# Patient Record
Sex: MALE | Race: WHITE
[De-identification: names, ages, dates, MRNs, and addresses within clinical notes are randomized per-mention and may not be internally consistent; named-entity substitution may affect disease eponyms.]

---

## 2020-07-31 ENCOUNTER — HOSPITAL ENCOUNTER (OUTPATIENT)
Dept: HOSPITAL 41 - JD.SDS | Age: 45
Discharge: HOME | End: 2020-07-31
Attending: SURGERY
Payer: COMMERCIAL

## 2020-07-31 DIAGNOSIS — E78.00: ICD-10-CM

## 2020-07-31 DIAGNOSIS — E78.5: ICD-10-CM

## 2020-07-31 DIAGNOSIS — Z87.891: ICD-10-CM

## 2020-07-31 DIAGNOSIS — E66.9: ICD-10-CM

## 2020-07-31 DIAGNOSIS — Z99.89: ICD-10-CM

## 2020-07-31 DIAGNOSIS — K21.9: ICD-10-CM

## 2020-07-31 DIAGNOSIS — K80.64: Primary | ICD-10-CM

## 2020-07-31 DIAGNOSIS — Z11.59: ICD-10-CM

## 2020-07-31 DIAGNOSIS — G47.33: ICD-10-CM

## 2020-07-31 DIAGNOSIS — Z79.899: ICD-10-CM

## 2020-07-31 PROCEDURE — 87635 SARS-COV-2 COVID-19 AMP PRB: CPT

## 2020-07-31 PROCEDURE — U0002 COVID-19 LAB TEST NON-CDC: HCPCS

## 2020-07-31 PROCEDURE — 47562 LAPAROSCOPIC CHOLECYSTECTOMY: CPT

## 2020-07-31 NOTE — PCM.POSTAN
POST ANESTHESIA ASSESSMENT





- MENTAL STATUS


Mental Status: Somnolent





- VITAL SIGNS


Vital Signs: 


                                Last Vital Signs











Temp  97.6 F   07/31/20 14:25


 


Pulse  65   07/31/20 10:55


 


Resp  16   07/31/20 14:25


 


BP  119/84   07/31/20 14:25


 


Pulse Ox  100   07/31/20 14:25














- RESPIRATORY


Respiratory Status: Respiratory Rate WNL, Airway Patent, O2 Saturation Stable, 

Supplemental Oxygen





- CARDIOVASCULAR


CV Status: Pulse Rate WNL, Blood Pressure Stable





- GASTROINTESTINAL


GI Status: No Symptoms





- PAIN


Pain Score: 0





- POST OP HYDRATION


Hydration Status: Adequate & Stable

## 2020-07-31 NOTE — [id number]
Post Anesthesia Note





- EVALUATION WITHIN 48HRS OF ANESTHETIC


Vital Signs in Normal Range: Yes


Patient Participated in Evaluation: Yes


Respiratory Function Stable: Yes


Airway Patent: Yes


Cardiovascular Function Stable: Yes


Hydration Status Stable: Yes


Pain Control Satisfactory: Yes


Nausea and Vomiting Control Satisfactory: Yes


Mental Status Recovered: Yes


Vital Signs: 


                                Last Vital Signs











Temp  97.6 F   07/31/20 14:25


 


Pulse  80   07/31/20 15:15


 


Resp  13   07/31/20 15:30


 


BP  119/83   07/31/20 15:30


 


Pulse Ox  100   07/31/20 15:36














- COMMENTS/OBSERVATIONS


Free Text/Narrative:: 





No anesthesia complications noted. Patient is transferred to room 18 for 

extended floor recovery

## 2020-07-31 NOTE — PCM.PREANE
Preanesthetic Assessment





- Procedure


Proposed Procedure: 





lap choley





- Anesthesia/Transfusion/Family Hx


Anesthesia History: Prior Anesthesia Without Reaction


Family History of Anesthesia Reaction: No


Transfusion History: No Prior Transfusion(s)





- Review of Systems


General: No Symptoms


Pulmonary: No Symptoms


Cardiovascular: No Symptoms


Gastrointestinal: Abdominal Pain (GB)


Neurological: No Symptoms





- Physical Assessment


NPO Status Date: 07/30/20


NPO Status Time: 20:00


Vital Signs: 





124/79


65


98%


16


98.2


Height: 6 ft


Weight: 120 kg


ASA Class: 2


Mental Status: Alert & Oriented x3


Airway Class: Mallampati = 1


Dentition: Reports: Normal Dentition


Thyro-Mental Finger Breadths: 3


Mouth Opening Finger Breadths: 3


ROM/Head Extension: Full


Lungs: Clear to Auscultation, Normal Respiratory Effort


Cardiovascular: Regular Rate, Regular Rhythm





- Allergies


Allergies/Adverse Reactions: 


                                    Allergies











Allergy/AdvReac Type Severity Reaction Status Date / Time


 


No Known Allergies Allergy   Verified 07/30/20 12:38














- Blood


Blood Available: No





- Acknowledgements


Anesthesia Type Planned: General Anesthesia


Pt an Appropriate Candidate for the Planned Anesthesia: Yes


Alternatives and Risks of Anesthesia Discussed w Pt/Guardian: Yes


Pt/Guardian Understands and Agrees with Anesthesia Plan: Yes





PreAnesthesia Questionnaire


HEENT History: Reports: None


Cardiovascular History: Reports: High Cholesterol


Respiratory History: Reports: Sleep Apnea (uses cpap)


Gastrointestinal History: Reports: GERD


Genitourinary History: Reports: None


OB/GYN History: Reports: None


Musculoskeletal History: Reports: None


Neurological History: Reports: None


Psychiatric History: Reports: None


Endocrine/Metabolic History: Reports: Obesity/BMI 30+


Hematologic History: Reports: None


Immunologic History: Reports: None


Oncologic (Cancer) History: Reports: None


Dermatologic History: Reports: None





- Past Surgical History


HEENT Surgical History: Reports: LASIK, Oral Surgery


Cardiovascular Surgical History: Reports: None


Respiratory Surgical History: Reports: None


GI Surgical History: Reports: Bariatric Procedure


Female  Surgical History: Reports: None


Male  Surgical History: Reports: None


Endocrine Surgical History: Reports: None


Neurological Surgical History: Reports: None


Oncologic Surgical History: Reports: None


Dermatological Surgical History: Reports: None





- SUBSTANCE USE


Smoking Status *Q: Former Smoker


Tobacco Use Within Last Twelve Months: Smokeless Tobacco (quit jan 2020)


Second Hand Smoke Exposure: No


Days Per Week of Alcohol Use: 2


Number of Drinks Per Day: 2


Total Drinks Per Week: 4


Recreational Drug Use History: No





- HOME MEDS


Home Medications: 


                                    Home Meds





Benzonatate [Tessalon Perle] 200 mg PO TID PRN 07/30/20 [History]


Cyanocobalamin (Vitamin B-12) [Vitamin B-12] 1,000 mcg PO DAILY 07/30/20 

[History]


Fenofibrate 160 mg PO DAILY 07/30/20 [History]


Multivit-Min/Folic/Vit K/Lycop [Men's Multivitamin Tablet] 1 tab PO DAILY 

07/30/20 [History]


Pantoprazole Sodium [Protonix] 40 mg PO DAILY 07/30/20 [History]


Sodium Chloride/Aloe Vera [Ayr Saline Nasal Gel Spray] 1 dose NASBOTH Q2H PRN 

07/30/20 [History]


Thiamine Mononitrate (Vit B1) [Vitamin B-1] 100 mg PO DAILY 07/30/20 [History]











- CURRENT (IN HOUSE) MEDS


Current Meds: 





                               Current Medications





Lactated Ringer's (Ringers, Lactated)  1,000 mls @ 125 mls/hr IV ASDIRECTED MILVIA


   Stop: 07/31/20 23:00


Lidocaine/Sodium Bicarbonate (Buffered Lidocaine 1% In Ns 8.4%)  0.25 ml IDERM 

ONETIME PRN


   PRN Reason: Prior to IV Start


   Stop: 07/31/20 18:00


Sodium Chloride (Saline Flush)  10 ml FLUSH ASDIRECTED PRN


   PRN Reason: Keep Vein Open


   Stop: 07/31/20 18:00

## 2020-08-01 NOTE — OR
DATE OF OPERATION:  07/31/2020

 

SURGEON:  Mustapha Baca MD

 

PREOPERATIVE DIAGNOSIS:

Biliary colic.

 

POSTOPERATIVE DIAGNOSIS:

Biliary colic.

 

OPERATION PERFORMED:  Laparoscopic cholecystectomy.

 

ANESTHESIA:

General endotracheal.

 

ESTIMATED BLOOD LOSS:

10 mL.

 

COMPLICATIONS:

None.

 

FINDINGS:

Cholelithiasis, scarring at the cholecystic window.

 

INDICATIONS AND CONSENT:

The patient is a 45-year-old male who was having right upper quadrant pain

associated with food.  The patient went to the walk-in clinic.  Ultrasound there

revealed possible gallstones, and due to his symptoms and of those findings, he

was referred to my office.  I evaluated the patient, and based on symptoms and

imaging findings, I concluded that the patient had biliary colic, and I offered

him laparoscopic cholecystectomy.  The common bile duct on ultrasound was

normal, and the patient was not jaundiced.  We discussed in detail risks,

benefits, and alternatives to this procedure.  All questions were answered, and

informed consent was obtained after the patient agreed to proceed with the

procedure.

 

DESCRIPTION OF PROCEDURE:

The patient was taken to the operating room, placed in supine position.

Following induction of general endotracheal anesthesia, preoperative antibiotics

consisting of Ancef were provided.  The patient was appropriately padded, and

abdomen was clipped of any hair and prepped and draped in the usual sterile

fashion.  Formal time-out was performed prior to the start of the procedure.

 

We began the procedure by making an infraumbilical incision.  Umbilical stalk

was elevated with Kocher clamp, and a Veress needle was introduced into the

abdomen, and the abdomen was insufflated to 15 mmHg.  Then, a 12 mm port was

inserted through this infraumbilical incision under direct visualization of a

10-30 laparoscopic camera.  The abdomen was entered.  Upon inspection of the

abdomen, there was no injury to Veress needle insertion or to trocar insertion.

Then, 3 additional trocars were placed, one 5 mm in the suprapubic aspect and 2

additional 5 mm trocars were placed in the right subcostal area.  All the

subsequent trocars were placed under direct visualization.  Then, we turned our

attention to the right upper quadrant.  The gallbladder was draped with omentum,

and omentum was adherent to the gallbladder indicating prior episodes of

inflammation.  Using cautery, the omentum was peeled away from the gallbladder.

Then, the gallbladder was grasped and retracted cephalad.  The infundibulum and

the cystic duct were then carefully dissected using hook cautery, and the cystic

duct was clearly isolated, and a critical view of safety was reached.

Surprisingly, the cystic artery was not visualized even after dissecting about

the lower third of the gallbladder from the cystic plate.  However, the cystic

duct was clearly visualized.  This was clipped with 3 clips and transected

leaving 2 clips in situ.  Then, carefully, we began taking the gallbladder off

the cystic plate, and eventually, we found a structure in the middle of the

cystic plate that was going directly into the gallbladder and appeared to be an

arterial structure.  We believe, this was the cystic artery, and it was small in

caliber.  This was clipped with 3 clips and transected leaving 2 clips in situ.

Then, we proceeded with our dissection using hook cautery to remove the

gallbladder.  As we are about to finish the dissection of the gallbladder, there

was a hole made into the gallbladder and few small stones spilled into the

abdomen.  These were all suctioned out, and the gallbladder was removed and

placed in the Endo Catch bag.  At this point, the cystic plate was irrigated

with 500 mL of warm saline, and all stones were suctioned out from the abdomen.

There was no evidence of bleeding.  At this point, the gallbladder was taken

out, and the infraumbilical incision was closed with 0 Vicryl stitches using

Dileep-Carito device, and abdomen was desufflated.  The rest of the incisions

were closed at the skin level with 4-0 Monocryl.  This marked the end of the

procedure.  At the end of the procedure, all instruments, sharps, and sponges

were counted and found to be correct x2.  The patient was awoken from

anesthesia, taken to the PACU in stable condition.  The patient to be allowed to

go home today with some pain medications.  The patient will have followup in 2

weeks for recheck.

 

DD:  08/01/2020 10:06:32

DT:  08/01/2020 10:41:34  MMODAL

Job #:  [id number]

KENYON